# Patient Record
Sex: FEMALE | Race: WHITE | Employment: FULL TIME | URBAN - METROPOLITAN AREA
[De-identification: names, ages, dates, MRNs, and addresses within clinical notes are randomized per-mention and may not be internally consistent; named-entity substitution may affect disease eponyms.]

---

## 2017-03-26 ENCOUNTER — HOSPITAL ENCOUNTER (EMERGENCY)
Age: 67
Discharge: HOME | End: 2017-03-26
Attending: EMERGENCY MEDICINE | Admitting: EMERGENCY MEDICINE
Payer: MEDICARE

## 2017-03-26 VITALS
HEIGHT: 65 IN | BODY MASS INDEX: 48.82 KG/M2 | DIASTOLIC BLOOD PRESSURE: 68 MMHG | HEART RATE: 70 BPM | OXYGEN SATURATION: 100 % | SYSTOLIC BLOOD PRESSURE: 167 MMHG | WEIGHT: 293 LBS | RESPIRATION RATE: 16 BRPM | TEMPERATURE: 97.9 F

## 2017-03-26 DIAGNOSIS — B02.9 HERPES ZOSTER WITHOUT COMPLICATION: Primary | ICD-10-CM

## 2017-03-26 PROCEDURE — 99283 EMERGENCY DEPT VISIT LOW MDM: CPT

## 2017-03-26 PROCEDURE — 74011250637 HC RX REV CODE- 250/637: Performed by: PHYSICIAN ASSISTANT

## 2017-03-26 RX ORDER — LOSARTAN POTASSIUM 25 MG/1
25 TABLET ORAL DAILY
COMMUNITY

## 2017-03-26 RX ORDER — HYDROCODONE BITARTRATE AND ACETAMINOPHEN 5; 325 MG/1; MG/1
1 TABLET ORAL
Status: COMPLETED | OUTPATIENT
Start: 2017-03-26 | End: 2017-03-26

## 2017-03-26 RX ORDER — ACYCLOVIR 800 MG/1
800 TABLET ORAL
Qty: 35 TAB | Refills: 0 | Status: SHIPPED | OUTPATIENT
Start: 2017-03-26 | End: 2017-04-02

## 2017-03-26 RX ORDER — TRAVOPROST OPHTHALMIC SOLUTION 0.04 MG/ML
1 SOLUTION OPHTHALMIC EVERY EVENING
COMMUNITY

## 2017-03-26 RX ORDER — HYDROCODONE BITARTRATE AND ACETAMINOPHEN 5; 325 MG/1; MG/1
1 TABLET ORAL
Qty: 20 TAB | Refills: 0 | Status: SHIPPED | OUTPATIENT
Start: 2017-03-26

## 2017-03-26 RX ORDER — DULOXETIN HYDROCHLORIDE 60 MG/1
60 CAPSULE, DELAYED RELEASE ORAL DAILY
COMMUNITY

## 2017-03-26 RX ORDER — ACYCLOVIR 800 MG/1
800 TABLET ORAL
Status: COMPLETED | OUTPATIENT
Start: 2017-03-26 | End: 2017-03-26

## 2017-03-26 RX ADMIN — HYDROCODONE BITARTRATE AND ACETAMINOPHEN 1 TABLET: 5; 325 TABLET ORAL at 20:00

## 2017-03-26 RX ADMIN — ACYCLOVIR 800 MG: 800 TABLET ORAL at 20:00

## 2017-03-26 NOTE — ED NOTES
Patient arrives for sharp pain in the posterior occipital aspect of patient's head. Patient states that it was 2 episodes of very intense sharp pain that lasted approx less than one minute. Patient states that now she feels a tingling sensation in that same area that radiates to the R side of her cheek. Denies weakness/tingling in arms/legs. Patient's  reports that she didn't look right but her speech was clear and not garbled. Denies visual changes. No distress noted. Will continue to monitor.

## 2017-03-27 NOTE — ED PROVIDER NOTES
HPI Comments: Liz Trevizo is a 77 y.o. Female with PMHx significant for HTN, morbid obesity, and arthritis who presents ambulatory to the ED with c/o two episodes of sharp pain coming from the posterior occipital aspect of her head at ~5:00 PM this evening. Pt reports that the first episode was very sudden and she was able to ease it by pressing on the area. Then the second episode occurred and she could not touch it because it was so painful. Pt reports that the pain lasted less than one minute. She reports that she is now having tingling sensations in the same area that radiates down her left jaw. She denies any injuries. She denies any current symptoms. Of note, she denied any h/o chicken pox, but notes that her sister had chicken pox when they were children. She has never received the chicken pox vaccine. She specifically denies any fevers, chills, nausea, vomiting, diarrhea, or headache. Of note, pt's  will be driving the pt home tonight. PCP: rPavin Gregorio MD     Social hx: + Former Smoker, - EtOH, - Illicit Drugs    There are no other complaints, changes, or physical findings at this time. Written by MITRA Sosa, as dictated by Radha Rizvi. The history is provided by the patient. No  was used.         Past Medical History:   Diagnosis Date    Arthritis     Chronic pain     generalized    Hypertension     Morbid obesity ([address]     Other ill-defined conditions(799.89)     gallstone    Sleep apnea     Unspecified adverse effect of anesthesia     Unspecified sleep apnea     uses cpap       Past Surgical History:   Procedure Laterality Date    HX CATARACT REMOVAL      bilateral/ lens  implant    HX CATARACT REMOVAL      bilateral    HX KNEE REPLACEMENT      left knee    HX OTHER SURGICAL      tumor removed left leg    HX OTHER SURGICAL      Carpal tunnel bilateral         Family History:   Problem Relation Age of Onset    Cancer Mother  Heart Disease Mother     Heart Disease Paternal Grandmother        Social History     Social History    Marital status:      Spouse name: N/A    Number of children: N/A    Years of education: N/A     Occupational History    Not on file. Social History Main Topics    Smoking status: Former Smoker     Packs/day: 1.00     Quit date: 3/27/1995    Smokeless tobacco: Never Used    Alcohol use No    Drug use: No    Sexual activity: Not on file     Other Topics Concern    Not on file     Social History Narrative         ALLERGIES: Bactrim [sulfamethoprim ds]; Bisoprolol fumarate; Other medication; and Prednisone u.s.p. Review of Systems   Constitutional: Negative for activity change, appetite change, chills, fever and unexpected weight change. HENT: Negative for congestion. Eyes: Negative for pain and visual disturbance. Respiratory: Negative for cough and shortness of breath. Cardiovascular: Negative for chest pain. Gastrointestinal: Negative for abdominal pain, diarrhea, nausea and vomiting. Genitourinary: Negative for dysuria. Musculoskeletal: Negative for back pain. Skin: Negative for rash.        + Pain on occipital scalp   Neurological: Negative for headaches. Vitals:    03/26/17 1853   BP: 167/68   Pulse: 70   Resp: 16   Temp: 97.9 °F (36.6 °C)   SpO2: 100%   Weight: (!) 165.7 kg (365 lb 4.8 oz)   Height: 5' 5\" (1.651 m)            Physical Exam   Constitutional: She is oriented to person, place, and time. She appears well-developed and well-nourished. No distress. Pt is ambulatory. + Obese. HENT:   Head: Normocephalic and atraumatic. Right Ear: External ear normal.   Left Ear: External ear normal.   Nose: Nose normal.   Mouth/Throat: Oropharynx is clear and moist. No oropharyngeal exudate. Eyes: Conjunctivae and EOM are normal. Pupils are equal, round, and reactive to light. Right eye exhibits no discharge. Left eye exhibits no discharge.  No scleral icterus. Neck: Normal range of motion. Neck supple. No tracheal deviation present. Cardiovascular: Normal rate, regular rhythm, normal heart sounds and intact distal pulses. Exam reveals no gallop and no friction rub. No murmur heard. Pulmonary/Chest: Effort normal and breath sounds normal. No respiratory distress. She has no wheezes. She has no rales. She exhibits no tenderness. Abdominal: Soft. Bowel sounds are normal. She exhibits no distension and no mass. There is no tenderness. There is no rebound and no guarding. Musculoskeletal: She exhibits no edema or tenderness. Lymphadenopathy:     She has no cervical adenopathy. Neurological: She is alert and oriented to person, place, and time. No cranial nerve deficit.   + Cranial nerves 2-12 grossly intact. Skin: Skin is warm and dry. Rash noted. No erythema.   + Erythematous rash on her left occipital scalp with small, clear blisters that are approximately 1-2 mm in diameter. Not hot to the touch. There is no pus. It does not cross her scalp midline or C3 dermatome. Psychiatric: She has a normal mood and affect. Her behavior is normal.   Nursing note and vitals reviewed. MDM  Number of Diagnoses or Management Options  Diagnosis management comments: DDx: Shingles, allergic reaction, folliculitis, cellulitis       Amount and/or Complexity of Data Reviewed  Review and summarize past medical records: yes    Patient Progress  Patient progress: stable        Procedures    Progress note:  8:18 PM  Pt agrees to follow up as recommended. All questions were answered. Her vital signs are stable for discharge. Written by Vernona Opitz, ED Scribe, as dictated by Flash Mcmanus. MEDICATIONS GIVEN:  Medications   HYDROcodone-acetaminophen (NORCO) 5-325 mg per tablet 1 Tab (1 Tab Oral Given 3/26/17 2000)   acyclovir (ZOVIRAX) tablet 800 mg (800 mg Oral Given 3/26/17 2000)       IMPRESSION:  1.  Herpes zoster without complication PLAN:  1. Discharge Medication List as of 3/26/2017  8:16 PM      START taking these medications    Details   HYDROcodone-acetaminophen (NORCO) 5-325 mg per tablet Take 1 Tab by mouth every four (4) hours as needed for Pain. Max Daily Amount: 6 Tabs., Print, Disp-20 Tab, R-0      acyclovir (ZOVIRAX) 800 mg tablet Take 1 Tab by mouth five (5) times daily for 7 days. , Normal, Disp-35 Tab, R-0         CONTINUE these medications which have NOT CHANGED    Details   losartan (COZAAR) 25 mg tablet Take 25 mg by mouth daily. , Historical Med      DULoxetine (CYMBALTA) 60 mg capsule Take 60 mg by mouth daily. , Historical Med      travoprost (TRAVATAN Z) 0.004 % ophthalmic solution Administer 1 Drop to both eyes every evening., Historical Med      NAPROXEN PO Take  by mouth as needed., Historical Med      diclofenac EC (VOLTAREN) 75 mg EC tablet Take  by mouth. Historical Med      omeprazole (PRILOSEC) 20 mg capsule Take 20 mg by mouth daily. Historical Med, 20 mg      aspirin 81 mg tablet Take 81 mg by mouth. Historical Med, 81 mg      calcium-cholecalciferol, d3, (CALCIUM 600 + D) 600-125 mg-unit Tab Take 1.5 Tabs by mouth daily. Historical Med, 1.5 Tab      amLODIPine (NORVASC) 5 mg tablet Take 10 mg by mouth daily. , Historical Med      PV W-O KP/FERROUS FUMARATE/FA (M-VIT PO) Take 1 Tab by mouth daily. Historical Med, 1 Tab      Cholecalciferol, Vitamin D3, (VITAMIN D3) 1,000 unit Cap Take 2,000 Units by mouth daily. Historical Med, 2,000 Units      methocarbamol (ROBAXIN) 500 mg tablet Take  by mouth four (4) times daily. Historical Med      cholestyramine light (PREVALITE) 4 gram packet Take 4 g by mouth two (2) times a day. Historical Med, 4 g      omega-3 fatty acids-vitamin e (FISH OIL) 1,000 mg Cap Take 2 Caps by mouth two (2) times a day. Historical Med, 2 Cap      Hydrochlorothiazide 12.5 mg tablet Take 12.5 mg by mouth daily.   Historical Med, 12.5 mg      olmesartan (BENICAR) 20 mg tablet Take 40 mg by mouth daily. , Historical Med         STOP taking these medications       TRAMADOL HCL (TRAMADOL PO) Comments:   Reason for Stopping:         hydrocodone-acetaminophen 5-500 mg Cap Comments:   Reason for Stoppin.   Follow-up Information     Follow up With Details Comments 2029 Eloina Eddy MD Schedule an appointment as soon as possible for a visit  [address] [facility]   [phone]      [facility] EMERGENCY DEPT  If symptoms worsen [address] [place] [address]  [phone]        Return to ED if worse     DISCHARGE NOTE:  8:18 PM  The patient is ready for discharge. The patients signs, symptoms, diagnosis, and instructions for discharge have been discussed and the pt has conveyed their understanding. The patient is to follow up as recommended with Nolberto Capellan MD or return to the ER should their symptoms worsen. Plan has been discussed and patient has conveyed their agreement. This note is prepared by Aidee Yang, acting as Scribe for Minda Stephens. ALF Real Call: The scribe's documentation has been prepared under my direction and personally reviewed by me in its entirety. I confirm that the note above accurately reflects all work, treatment, procedures, and medical decision making performed by me.

## 2017-03-27 NOTE — DISCHARGE INSTRUCTIONS
